# Patient Record
(demographics unavailable — no encounter records)

---

## 2025-03-07 NOTE — PLAN
[FreeTextEntry1] : Known history of PCOS and options for conception discussed at length and is recommended she schedule with reproductive endocrinology.  She also reports an inability to lose weight despite diet and exercise.  After discussion it is recommended she seek consultation with a weight loss specialist list and a message was sent forward to Dr. Clemens's office and she will be scheduled accordingly.  She was given opportunity ask questions all questions were addressed.

## 2025-03-07 NOTE — COUNSELING
[Nutrition/ Exercise/ Weight Management] : nutrition, exercise, weight management [Body Image] : body image [Vitamins/Supplements] : vitamins/supplements [Preconception Care/ Fertility options] : preconception care, fertility options [Confidentiality] : confidentiality [Medication Management] : medication management

## 2025-03-07 NOTE — PHYSICAL EXAM
[Appropriately responsive] : appropriately responsive [Alert] : alert [No Acute Distress] : no acute distress [Soft] : soft [Non-tender] : non-tender [Non-distended] : non-distended [No HSM] : No HSM [No Lesions] : no lesions [No Mass] : no mass [Oriented x3] : oriented x3 [FreeTextEntry7] : Pfannenstiel incision scar

## 2025-06-04 NOTE — REASON FOR VISIT
[Home] : at home, [unfilled] , at the time of the visit. [Medical Office: ([facility])___] : at the medical office located in  [Telehealth (audio & video)] : This visit was provided via telehealth using real-time 2-way audio visual technology. [Verbal consent obtained from patient] : the patient, [unfilled] [Follow-Up] : a follow-up visit